# Patient Record
Sex: MALE | URBAN - METROPOLITAN AREA
[De-identification: names, ages, dates, MRNs, and addresses within clinical notes are randomized per-mention and may not be internally consistent; named-entity substitution may affect disease eponyms.]

---

## 2020-08-25 ENCOUNTER — OFFICE VISIT (OUTPATIENT)
Dept: URBAN - METROPOLITAN AREA CLINIC 19 | Facility: CLINIC | Age: 55
End: 2020-08-25
Payer: COMMERCIAL

## 2020-08-25 ENCOUNTER — DASHBOARD ENCOUNTERS (OUTPATIENT)
Age: 55
End: 2020-08-25

## 2020-08-25 DIAGNOSIS — R12 HEARTBURN: ICD-10-CM

## 2020-08-25 DIAGNOSIS — R19.8 ALTERNATING CONSTIPATION AND DIARRHEA: ICD-10-CM

## 2020-08-25 DIAGNOSIS — R10.13 DYSPEPSIA: ICD-10-CM

## 2020-08-25 PROBLEM — 162031009: Status: ACTIVE | Noted: 2020-08-25

## 2020-08-25 PROBLEM — 16331000: Status: ACTIVE | Noted: 2020-08-25

## 2020-08-25 PROCEDURE — 99203 OFFICE O/P NEW LOW 30 MIN: CPT | Performed by: INTERNAL MEDICINE

## 2020-08-25 PROCEDURE — 99243 OFF/OP CNSLTJ NEW/EST LOW 30: CPT | Performed by: INTERNAL MEDICINE

## 2020-08-25 NOTE — HPI-TODAY'S VISIT:
Patient presents for evaluation of a variety of gastrointestinal complaints.  His main complaints revolve around erratic bowel movements, alternating between diarrhea and constipation.  He has not seen any blood in the stool.  He has had some abdominal cramping, but he mainly has issues with post-prandial bowel urgency.  His last colonoscopy was in 2008, and while he had colon polyps, he elected to use Cologuard for further screenings - his last one was negative in 2019.  He was diagnosed with diabetes mellitus about 1 1/2 years ago and started metformin.  He admits that this caused a lot of GI "issues" that improved when he decrease the dose.  He has recently started Ozempic for better control of his disease.  He also has chronic heartburn, which is controlled with acid-reducing medications.  Records reviewed from referring [facility].  Patient was seen by his PCP on 6/17/20 for intermittent nausea/vomiting that had occurred mainly after eating certain foods, like broccoli.  It was thought that this may be related to GERD.  When asked about this time period, the patient stated that he had a few episodes of spontaneous nausea/vomiting, but this has essentially resolved.

## 2021-08-04 ENCOUNTER — OFFICE VISIT (OUTPATIENT)
Dept: URGENT CARE | Facility: CLINIC | Age: 56
End: 2021-08-04
Payer: COMMERCIAL

## 2021-08-04 ENCOUNTER — ANCILLARY PROCEDURE (OUTPATIENT)
Dept: RADIOLOGY | Facility: CLINIC | Age: 56
End: 2021-08-04
Payer: COMMERCIAL

## 2021-08-04 VITALS
DIASTOLIC BLOOD PRESSURE: 84 MMHG | TEMPERATURE: 98 F | HEART RATE: 72 BPM | SYSTOLIC BLOOD PRESSURE: 134 MMHG | RESPIRATION RATE: 18 BRPM | OXYGEN SATURATION: 93 %

## 2021-08-04 DIAGNOSIS — R05.9 COUGH: ICD-10-CM

## 2021-08-04 DIAGNOSIS — J18.9 PNEUMONIA DUE TO INFECTIOUS ORGANISM, UNSPECIFIED LATERALITY, UNSPECIFIED PART OF LUNG: Primary | ICD-10-CM

## 2021-08-04 LAB — SARS-COV-2 RDRP RESP QL NAA+PROBE: NORMAL

## 2021-08-04 PROCEDURE — 99203 OFFICE O/P NEW LOW 30 MIN: CPT | Performed by: NURSE PRACTITIONER

## 2021-08-04 PROCEDURE — 71046 X-RAY EXAM CHEST 2 VIEWS: CPT | Mod: TC | Performed by: NURSE PRACTITIONER

## 2021-08-04 PROCEDURE — C9803 HOPD COVID-19 SPEC COLLECT: HCPCS | Performed by: NURSE PRACTITIONER

## 2021-08-04 PROCEDURE — 87635 SARS-COV-2 COVID-19 AMP PRB: CPT | Mod: QW | Performed by: NURSE PRACTITIONER

## 2021-08-04 PROCEDURE — 71046 X-RAY EXAM CHEST 2 VIEWS: CPT | Mod: 26 | Performed by: RADIOLOGY

## 2021-08-04 RX ORDER — CODEINE PHOSPHATE AND GUAIFENESIN 10; 100 MG/5ML; MG/5ML
5 SOLUTION ORAL 3 TIMES DAILY PRN
Qty: 120 ML | Refills: 0 | Status: SHIPPED | OUTPATIENT
Start: 2021-08-04

## 2021-08-04 RX ORDER — AMOXICILLIN AND CLAVULANATE POTASSIUM 875; 125 MG/1; MG/1
1 TABLET, FILM COATED ORAL 2 TIMES DAILY
Qty: 20 TABLET | Refills: 0 | Status: SHIPPED | OUTPATIENT
Start: 2021-08-04 | End: 2021-08-14

## 2021-08-04 RX ORDER — BENZONATATE 100 MG/1
200 CAPSULE ORAL 3 TIMES DAILY PRN
Qty: 20 CAPSULE | Refills: 0 | Status: SHIPPED | OUTPATIENT
Start: 2021-08-04 | End: 2021-08-11

## 2021-08-04 ASSESSMENT — ENCOUNTER SYMPTOMS
FEVER: 0
HEADACHES: 1
RHINORRHEA: 0
COUGH: 1
SORE THROAT: 0
SHORTNESS OF BREATH: 0
CHILLS: 0

## 2021-08-04 NOTE — PROGRESS NOTES
Subjective      James Leiva is a 56 y.o. male who presents for   Chief Complaint   Patient presents with   • Headache     Woke him up around 0400 this morning. Patient reports he rarely gets headaches.   • Cough     Over the last two weeks. Got his Covid vaccine three weeks ago (J&J). Traveled here from [place] and cough started after that. Dry, non-productive. Causing some chest discomfort from the cough. Has not taken any medication for it.         HPI   James presents today for a cough that started approximately 2 weeks ago.  He reports his got his J&J Covid vaccine proximately 3 weeks ago.  He currently traveled here from [place] and his cough started after that.  He reports his cough is dry in nature and nonproductive.  He does have some chest discomfort when coughing.  This morning he woke up with a headache that he rates at 6/10.  He has not taking any medications for his headache or cough.  Denies shortness of breath however sometimes he feels like he could take it more air. Did have a fever about a month ago but currently afebrile.     The following have been reviewed and updated as appropriate in this visit:  Allergies  Meds  Problems  Med Hx  Surg Hx  Fam Hx         No Known Allergies  Current Outpatient Medications   Medication Sig Dispense Refill   • amoxicillin-pot clavulanate (Augmentin) 875-125 mg per tablet Take 1 tablet by mouth 2 (two) times a day for 10 days 20 tablet 0   • benzonatate (Tessalon Perles) 100 mg capsule Take 2 capsules (200 mg total) by mouth 3 (three) times a day as needed for cough for up to 7 days 20 capsule 0   • codeine-guaifenesin (ROBITUSSIN-AC)  mg/5 mL liquid Take 5 mL by mouth 3 (three) times a day as needed for cough Max Daily Amount: 15 mL 120 mL 0     No current facility-administered medications for this visit.     History reviewed. No pertinent past medical history.  History reviewed. No pertinent surgical history.  History reviewed. No pertinent  family history.  Social History     Occupational History   • Not on file   Tobacco Use   • Smoking status: Not on file   Substance and Sexual Activity   • Alcohol use: Not on file   • Drug use: Not on file   • Sexual activity: Not on file   Social History Narrative   • Not on file       Review of Systems   Constitutional: Negative for chills and fever.   HENT: Positive for congestion. Negative for ear pain, postnasal drip, rhinorrhea and sore throat.    Respiratory: Positive for cough. Negative for shortness of breath.    Cardiovascular: Positive for chest pain (He reports his chest pain is due to coughing and worsens with certian movements. ).   Neurological: Positive for headaches.       Objective   Vitals:    08/04/21 0721   BP: 134/84   Temp: 36.7 °C (98 °F)   TempSrc: Temporal   Pulse: 72   Resp: 18   SpO2: 93%   Patient Position: Sitting        Physical Exam  Vitals and nursing note reviewed.   Constitutional:       General: He is not in acute distress.     Appearance: Normal appearance.   HENT:      Head: Normocephalic.      Right Ear: Tympanic membrane normal.      Left Ear: Tympanic membrane normal.      Nose: Congestion and rhinorrhea (Clear discharge) present.      Mouth/Throat:      Mouth: Mucous membranes are moist.      Pharynx: No posterior oropharyngeal erythema.   Eyes:      Conjunctiva/sclera: Conjunctivae normal.      Pupils: Pupils are equal, round, and reactive to light.   Cardiovascular:      Rate and Rhythm: Normal rate and regular rhythm.      Heart sounds: Normal heart sounds. No murmur heard.   No friction rub. No gallop.    Pulmonary:      Effort: Pulmonary effort is normal. No respiratory distress.      Breath sounds: Normal breath sounds. No wheezing or rales.   Lymphadenopathy:      Head:      Right side of head: No submental, submandibular or tonsillar adenopathy.      Left side of head: No submental, submandibular or tonsillar adenopathy.      Cervical: No cervical adenopathy.       Upper Body:      Right upper body: No supraclavicular adenopathy.      Left upper body: No supraclavicular adenopathy.   Skin:     General: Skin is warm and dry.   Neurological:      General: No focal deficit present.      Mental Status: He is alert and oriented to person, place, and time.         No results found for this or any previous visit (from the past 24 hour(s)).    Assessment/Plan   Diagnoses and all orders for this visit:    Pneumonia due to infectious organism, unspecified laterality, unspecified part of lung  -     amoxicillin-pot clavulanate (Augmentin) 875-125 mg per tablet; Take 1 tablet by mouth 2 (two) times a day for 10 days    Cough  -     POCT COVID-19, IDNOW PCR  -     X-ray chest 2 views  -     amoxicillin-pot clavulanate (Augmentin) 875-125 mg per tablet; Take 1 tablet by mouth 2 (two) times a day for 10 days  -     benzonatate (Tessalon Perles) 100 mg capsule; Take 2 capsules (200 mg total) by mouth 3 (three) times a day as needed for cough for up to 7 days  -     codeine-guaifenesin (ROBITUSSIN-AC)  mg/5 mL liquid; Take 5 mL by mouth 3 (three) times a day as needed for cough Max Daily Amount: 15 mL    Did discuss drawing labs to support the diagnosis with patient and EKG as he is reporting chest pain which is most likely musculoskeletal in nature but pt declined.   Preliminary review of x-ray was discussed with patient during visit.  We are awaiting final read.  We will treat for probable pneumonia with Augmentin as prescribed.  Patient had requested something for cough during the day so Tessalon Perles were prescribed.  To aid with sleep he can take Robitussin-AC as prescribed.  Encouraged hydration.  Recommended Tylenol or ibuprofen for discomfort.  Recommended follow-up if symptoms worsen.  Patient verbalized agreement satisfaction the plan of care as discussed.    There are no Patient Instructions on file for this visit.    Liz Lindquist, DNP

## 2022-08-09 ENCOUNTER — OFFICE VISIT (OUTPATIENT)
Dept: URGENT CARE | Facility: CLINIC | Age: 57
End: 2022-08-09
Payer: COMMERCIAL

## 2022-08-09 VITALS
TEMPERATURE: 97.4 F | SYSTOLIC BLOOD PRESSURE: 163 MMHG | OXYGEN SATURATION: 98 % | DIASTOLIC BLOOD PRESSURE: 92 MMHG | HEART RATE: 73 BPM | RESPIRATION RATE: 20 BRPM | WEIGHT: 193 LBS

## 2022-08-09 DIAGNOSIS — K21.9 GASTROESOPHAGEAL REFLUX DISEASE, UNSPECIFIED WHETHER ESOPHAGITIS PRESENT: Primary | ICD-10-CM

## 2022-08-09 PROCEDURE — 93005 ELECTROCARDIOGRAM TRACING: CPT | Performed by: NURSE PRACTITIONER

## 2022-08-09 PROCEDURE — 99213 OFFICE O/P EST LOW 20 MIN: CPT | Mod: 25

## 2022-08-09 RX ORDER — FAMOTIDINE 20 MG/1
20 TABLET, FILM COATED ORAL NIGHTLY PRN
COMMUNITY
Start: 2022-06-15

## 2022-08-09 RX ORDER — OMEPRAZOLE 40 MG/1
40 CAPSULE, DELAYED RELEASE ORAL 2 TIMES DAILY
Qty: 14 CAPSULE | Refills: 0 | Status: SHIPPED | OUTPATIENT
Start: 2022-08-09 | End: 2022-08-16

## 2022-08-09 RX ORDER — SEMAGLUTIDE 2.68 MG/ML
2 INJECTION, SOLUTION SUBCUTANEOUS
COMMUNITY

## 2022-08-09 RX ORDER — LOSARTAN POTASSIUM 25 MG/1
25 TABLET ORAL DAILY
COMMUNITY
Start: 2022-06-27

## 2022-08-09 RX ORDER — LIDOCAINE HYDROCHLORIDE 20 MG/ML
15 SOLUTION OROPHARYNGEAL
Status: DISCONTINUED | OUTPATIENT
Start: 2022-08-09 | End: 2022-08-09

## 2022-08-09 RX ORDER — LIDOCAINE HYDROCHLORIDE 20 MG/ML
15 SOLUTION OROPHARYNGEAL ONCE
Status: DISCONTINUED | OUTPATIENT
Start: 2022-08-09 | End: 2022-08-09

## 2022-08-09 RX ORDER — OMEPRAZOLE 40 MG/1
40 CAPSULE, DELAYED RELEASE ORAL DAILY
Qty: 30 CAPSULE | Refills: 0 | Status: SHIPPED | OUTPATIENT
Start: 2022-08-09 | End: 2022-09-08

## 2022-08-09 RX ORDER — ROSUVASTATIN CALCIUM 10 MG/1
10 TABLET, COATED ORAL NIGHTLY
COMMUNITY
Start: 2022-07-08

## 2022-08-09 RX ORDER — ALUMINUM HYDROXIDE, MAGNESIUM HYDROXIDE, AND SIMETHICONE 1200; 120; 1200 MG/30ML; MG/30ML; MG/30ML
30 SUSPENSION ORAL ONCE
Status: COMPLETED | OUTPATIENT
Start: 2022-08-09 | End: 2022-08-09

## 2022-08-09 RX ORDER — LIDOCAINE HYDROCHLORIDE 20 MG/ML
15 SOLUTION OROPHARYNGEAL ONCE
Status: COMPLETED | OUTPATIENT
Start: 2022-08-09 | End: 2022-08-09

## 2022-08-09 RX ORDER — GLIMEPIRIDE 2 MG/1
2 TABLET ORAL DAILY
COMMUNITY
Start: 2022-06-15

## 2022-08-09 RX ADMIN — LIDOCAINE HYDROCHLORIDE 15 ML: 20 SOLUTION OROPHARYNGEAL at 11:34

## 2022-08-09 RX ADMIN — ALUMINUM HYDROXIDE, MAGNESIUM HYDROXIDE, AND SIMETHICONE 30 ML: 1200; 120; 1200 SUSPENSION ORAL at 11:34

## 2022-08-09 ASSESSMENT — ENCOUNTER SYMPTOMS
COLOR CHANGE: 0
SEIZURES: 0
FLANK PAIN: 0
HEADACHES: 0
DIARRHEA: 0
NAUSEA: 0
ABDOMINAL PAIN: 1
FREQUENCY: 0
EYE PAIN: 0
SINUS PAIN: 0
RHINORRHEA: 0
COUGH: 0
ARTHRALGIAS: 0
SHORTNESS OF BREATH: 0
DYSURIA: 0
CONSTIPATION: 0
FEVER: 0
VOMITING: 0
SORE THROAT: 0
CHEST TIGHTNESS: 0
HEMATURIA: 0
CHILLS: 0
PALPITATIONS: 0
MYALGIAS: 0
DIZZINESS: 0
SLEEP DISTURBANCE: 0
SINUS PRESSURE: 0
BACK PAIN: 0

## 2022-08-09 NOTE — PATIENT INSTRUCTIONS
PLAN  - Start omeprazole (PriLOSEC) 40 mg capsule; Take 1 capsule (40 mg total) by mouth 2 (two) times a day for 7 days  - Start omeprazole (PriLOSEC) 40 mg capsule; Take 1 capsule (40 mg total) by mouth daily  - Clinic admin alum-mag hydroxide-simeth (MAALOX ADVANCED) suspension 30 mL  - Clinic admin lidocaine HCL ((viscous)) 2 % mucosal solution 15 mL  - May use Tylenol or Motrin as needed for discomfort.   - Increase fluid intake  - Balance rest and activity.   - Good hygiene measures discussed.   - Follow-up with primary care provider in 7 to 10 days if symptoms do not improve or worsen

## 2022-08-09 NOTE — PROGRESS NOTES
"Subjective      James Leiva is a 57 y.o. male who presents for gastroesophageal reflux disease, chest pain.  The patient states that he has been experiencing symptoms on and off for the past 5 days.  Describes his chest pain has been going process of the superiorly under his chin.  He states that his symptoms are intermittent but seem to be worse in the morning upon arising.  Denies sour taste in his mouth takes famotidine 20 mg p.o. daily for the treatment of gastroesophageal reflux disease.  He was transitioned from omeprazole recently to this medication.  Twelve-lead ECG today showed normal sinus rhythm with a rate in the 80s.  ST abnormalities do not appreciate CHG.  GI cocktail given in the clinic which offered the patient relief.  Plan to start the patient on omeprazole 40 mg twice daily for 7 days, and then daily after that until he follows up with his primary care provider.  Symptom-based management otherwise supportive with OTC remedies.  The patient agrees with the presumptive plan of care.  Chief Complaint   Patient presents with   • GERD     4-5 days, off an on chest \"pain\" that goes up to his throat. Hx of GERD, normally takes medication for it (pepcid) and got it refilled yesterday.         HPI    The following have been reviewed and updated as appropriate in this visit:   Allergies  Meds  Problems  Med Hx  Surg Hx  Fam Hx           No Known Allergies    Current Outpatient Medications   Medication Sig Dispense Refill   • famotidine (PEPCID) 20 mg tablet Take 20 mg by mouth nightly as needed     • glimepiride (AMARYL) 2 mg tablet Take 2 mg by mouth daily     • losartan (COZAAR) 25 mg tablet Take 25 mg by mouth daily     • semaglutide (Ozempic) 2 mg/dose (8 mg/3 mL) pen injector 2 mg     • rosuvastatin (CRESTOR) 10 mg tablet Take 10 mg by mouth nightly     • omeprazole (PriLOSEC) 40 mg capsule Take 1 capsule (40 mg total) by mouth 2 (two) times a day for 7 days 14 capsule 0   • omeprazole " (PriLOSEC) 40 mg capsule Take 1 capsule (40 mg total) by mouth daily 30 capsule 0   • codeine-guaifenesin (ROBITUSSIN-AC)  mg/5 mL liquid Take 5 mL by mouth 3 (three) times a day as needed for cough Max Daily Amount: 15 mL 120 mL 0     No current facility-administered medications for this visit.       History reviewed. No pertinent past medical history.    History reviewed. No pertinent surgical history.    History reviewed. No pertinent family history.    Social History     Socioeconomic History   • Marital status: Single       Review of Systems   Constitutional: Negative for chills and fever.   HENT: Negative for congestion, ear pain, rhinorrhea, sinus pressure, sinus pain and sore throat.    Eyes: Negative for pain and visual disturbance.   Respiratory: Negative for cough, chest tightness and shortness of breath.    Cardiovascular: Positive for chest pain. Negative for palpitations.   Gastrointestinal: Positive for abdominal pain. Negative for constipation, diarrhea, nausea and vomiting.   Genitourinary: Negative for dysuria, flank pain, frequency, hematuria and urgency.   Musculoskeletal: Negative for arthralgias, back pain and myalgias.   Skin: Negative for color change and rash.   Neurological: Negative for dizziness, seizures, syncope and headaches.   Psychiatric/Behavioral: Negative for sleep disturbance.   All other systems reviewed and are negative.      Objective     Vitals:    08/09/22 1046   BP: 163/92   Temp: 36.3 °C (97.4 °F)   TempSrc: Temporal   Pulse: 73   Resp: 20   SpO2: 98%   Weight: 87.5 kg (193 lb)   Patient Position: Sitting        Physical Exam  Constitutional:       General: He is not in acute distress.     Appearance: Normal appearance. He is normal weight. He is not ill-appearing or toxic-appearing.   HENT:      Head: Normocephalic and atraumatic.      Right Ear: Tympanic membrane, ear canal and external ear normal.      Left Ear: Tympanic membrane, ear canal and external ear  normal.      Nose: Nose normal.      Mouth/Throat:      Mouth: Mucous membranes are moist.      Pharynx: Oropharynx is clear.   Eyes:      Conjunctiva/sclera: Conjunctivae normal.      Pupils: Pupils are equal, round, and reactive to light.   Cardiovascular:      Rate and Rhythm: Normal rate and regular rhythm.      Pulses: Normal pulses.      Heart sounds: Normal heart sounds. No murmur heard.    No friction rub. No gallop.   Pulmonary:      Effort: Pulmonary effort is normal.      Breath sounds: Normal breath sounds.   Abdominal:      General: Abdomen is flat.   Musculoskeletal:      Cervical back: Neck supple. No tenderness.   Skin:     General: Skin is warm.      Capillary Refill: Capillary refill takes less than 2 seconds.   Neurological:      General: No focal deficit present.      Mental Status: He is alert.   Psychiatric:         Mood and Affect: Mood normal.         No results found for this or any previous visit (from the past 24 hour(s)).    Assessment/Plan     Diagnoses and all orders for this visit:    Gastroesophageal reflux disease, unspecified whether esophagitis present  -     ECG 12 lead -Normal, Today  -     alum-mag hydroxide-simeth (MAALOX ADVANCED) suspension 30 mL  -     lidocaine HCL ((viscous)) 2 % mucosal solution 15 mL  -     omeprazole (PriLOSEC) 40 mg capsule; Take 1 capsule (40 mg total) by mouth 2 (two) times a day for 7 days  -     omeprazole (PriLOSEC) 40 mg capsule; Take 1 capsule (40 mg total) by mouth daily    PLAN  - Start omeprazole (PriLOSEC) 40 mg capsule; Take 1 capsule (40 mg total) by mouth 2 (two) times a day for 7 days  - Start omeprazole (PriLOSEC) 40 mg capsule; Take 1 capsule (40 mg total) by mouth daily  - Clinic admin alum-mag hydroxide-simeth (MAALOX ADVANCED) suspension 30 mL  - Clinic admin lidocaine HCL ((viscous)) 2 % mucosal solution 15 mL  - May use Tylenol or Motrin as needed for discomfort.   - Increase fluid intake  - Balance rest and activity.   - Good  hygiene measures discussed.   - Follow-up with primary care provider in 7 to 10 days if symptoms do not improve or worsen      Patient Instructions   PLAN  - Start omeprazole (PriLOSEC) 40 mg capsule; Take 1 capsule (40 mg total) by mouth 2 (two) times a day for 7 days  - Start omeprazole (PriLOSEC) 40 mg capsule; Take 1 capsule (40 mg total) by mouth daily  - Clinic admin alum-mag hydroxide-simeth (MAALOX ADVANCED) suspension 30 mL  - Clinic admin lidocaine HCL ((viscous)) 2 % mucosal solution 15 mL  - May use Tylenol or Motrin as needed for discomfort.   - Increase fluid intake  - Balance rest and activity.   - Good hygiene measures discussed.   - Follow-up with primary care provider in 7 to 10 days if symptoms do not improve or worsen      Dante Enriquez, CNP

## 2022-08-11 PROCEDURE — 93010 ELECTROCARDIOGRAM REPORT: CPT | Performed by: INTERNAL MEDICINE
